# Patient Record
Sex: FEMALE | Race: WHITE | NOT HISPANIC OR LATINO | URBAN - METROPOLITAN AREA
[De-identification: names, ages, dates, MRNs, and addresses within clinical notes are randomized per-mention and may not be internally consistent; named-entity substitution may affect disease eponyms.]

---

## 2018-02-01 ENCOUNTER — OUTPATIENT (OUTPATIENT)
Dept: OUTPATIENT SERVICES | Facility: HOSPITAL | Age: 83
LOS: 1 days | End: 2018-02-01
Payer: MEDICAID

## 2018-02-01 PROCEDURE — G9001: CPT

## 2018-02-02 RX ORDER — ASPIRIN/CALCIUM CARB/MAGNESIUM 324 MG
1 TABLET ORAL
Qty: 0 | Refills: 0 | COMMUNITY
Start: 2018-02-02 | End: 2018-02-13

## 2018-02-07 ENCOUNTER — EMERGENCY (EMERGENCY)
Facility: HOSPITAL | Age: 83
LOS: 1 days | Discharge: HOME | End: 2018-02-07
Attending: EMERGENCY MEDICINE | Admitting: EMERGENCY MEDICINE
Payer: MEDICARE

## 2018-02-07 VITALS
TEMPERATURE: 98 F | HEART RATE: 79 BPM | OXYGEN SATURATION: 94 % | DIASTOLIC BLOOD PRESSURE: 69 MMHG | RESPIRATION RATE: 20 BRPM | SYSTOLIC BLOOD PRESSURE: 149 MMHG

## 2018-02-07 DIAGNOSIS — M25.552 PAIN IN LEFT HIP: ICD-10-CM

## 2018-02-07 DIAGNOSIS — Z96.642 PRESENCE OF LEFT ARTIFICIAL HIP JOINT: Chronic | ICD-10-CM

## 2018-02-07 LAB
ANION GAP SERPL CALC-SCNC: 10 MMOL/L — SIGNIFICANT CHANGE UP (ref 5–17)
APTT BLD: 26 SEC — LOW (ref 27.5–37.4)
BUN SERPL-MCNC: 28 MG/DL — HIGH (ref 7–23)
CALCIUM SERPL-MCNC: 8.4 MG/DL — SIGNIFICANT CHANGE UP (ref 8.4–10.5)
CHLORIDE SERPL-SCNC: 96 MMOL/L — SIGNIFICANT CHANGE UP (ref 96–108)
CO2 SERPL-SCNC: 28 MMOL/L — SIGNIFICANT CHANGE UP (ref 22–31)
CREAT SERPL-MCNC: 0.98 MG/DL — SIGNIFICANT CHANGE UP (ref 0.5–1.3)
GLUCOSE BLDC GLUCOMTR-MCNC: 118 MG/DL — HIGH (ref 70–99)
GLUCOSE BLDC GLUCOMTR-MCNC: 119 MG/DL — HIGH (ref 70–99)
GLUCOSE BLDC GLUCOMTR-MCNC: 123 MG/DL — HIGH (ref 70–99)
GLUCOSE BLDC GLUCOMTR-MCNC: 126 MG/DL — HIGH (ref 70–99)
GLUCOSE SERPL-MCNC: 141 MG/DL — HIGH (ref 70–99)
HCT VFR BLD CALC: 26.9 % — LOW (ref 34.5–45)
HGB BLD-MCNC: 9.5 G/DL — LOW (ref 11.5–15.5)
INR BLD: 0.96 RATIO — SIGNIFICANT CHANGE UP (ref 0.88–1.16)
MAGNESIUM SERPL-MCNC: 2.2 MG/DL — SIGNIFICANT CHANGE UP (ref 1.6–2.6)
MCHC RBC-ENTMCNC: 33.1 PG — SIGNIFICANT CHANGE UP (ref 27–34)
MCHC RBC-ENTMCNC: 35.4 GM/DL — SIGNIFICANT CHANGE UP (ref 32–36)
MCV RBC AUTO: 93.5 FL — SIGNIFICANT CHANGE UP (ref 80–100)
PHOSPHATE SERPL-MCNC: 3.6 MG/DL — SIGNIFICANT CHANGE UP (ref 2.5–4.5)
PLATELET # BLD AUTO: 227 K/UL — SIGNIFICANT CHANGE UP (ref 150–400)
POTASSIUM SERPL-MCNC: 4.7 MMOL/L — SIGNIFICANT CHANGE UP (ref 3.5–5.3)
POTASSIUM SERPL-SCNC: 4.7 MMOL/L — SIGNIFICANT CHANGE UP (ref 3.5–5.3)
PROTHROM AB SERPL-ACNC: 10.4 SEC — SIGNIFICANT CHANGE UP (ref 9.8–12.7)
RBC # BLD: 2.88 M/UL — LOW (ref 3.8–5.2)
RBC # FLD: 12.8 % — SIGNIFICANT CHANGE UP (ref 10.3–14.5)
SODIUM SERPL-SCNC: 134 MMOL/L — LOW (ref 135–145)
WBC # BLD: 8.5 K/UL — SIGNIFICANT CHANGE UP (ref 3.8–10.5)
WBC # FLD AUTO: 8.5 K/UL — SIGNIFICANT CHANGE UP (ref 3.8–10.5)

## 2018-02-07 RX ORDER — DEXTROSE 50 % IN WATER 50 %
12.5 SYRINGE (ML) INTRAVENOUS ONCE
Qty: 0 | Refills: 0 | Status: DISCONTINUED | OUTPATIENT
Start: 2018-02-07 | End: 2018-02-08

## 2018-02-07 RX ORDER — SODIUM CHLORIDE 9 MG/ML
1000 INJECTION, SOLUTION INTRAVENOUS
Qty: 0 | Refills: 0 | Status: DISCONTINUED | OUTPATIENT
Start: 2018-02-07 | End: 2018-02-08

## 2018-02-07 RX ORDER — DOCUSATE SODIUM 100 MG
100 CAPSULE ORAL THREE TIMES A DAY
Qty: 0 | Refills: 0 | Status: DISCONTINUED | OUTPATIENT
Start: 2018-02-07 | End: 2018-02-08

## 2018-02-07 RX ORDER — DULOXETINE HYDROCHLORIDE 30 MG/1
30 CAPSULE, DELAYED RELEASE ORAL DAILY
Qty: 0 | Refills: 0 | Status: DISCONTINUED | OUTPATIENT
Start: 2018-02-07 | End: 2018-02-08

## 2018-02-07 RX ORDER — VALSARTAN 80 MG/1
160 TABLET ORAL DAILY
Qty: 0 | Refills: 0 | Status: DISCONTINUED | OUTPATIENT
Start: 2018-02-07 | End: 2018-02-08

## 2018-02-07 RX ORDER — POLYETHYLENE GLYCOL 3350 17 G/17G
17 POWDER, FOR SOLUTION ORAL DAILY
Qty: 0 | Refills: 0 | Status: DISCONTINUED | OUTPATIENT
Start: 2018-02-07 | End: 2018-02-08

## 2018-02-07 RX ORDER — PANTOPRAZOLE SODIUM 20 MG/1
40 TABLET, DELAYED RELEASE ORAL
Qty: 0 | Refills: 0 | Status: DISCONTINUED | OUTPATIENT
Start: 2018-02-07 | End: 2018-02-08

## 2018-02-07 RX ORDER — SENNA PLUS 8.6 MG/1
2 TABLET ORAL AT BEDTIME
Qty: 0 | Refills: 0 | Status: DISCONTINUED | OUTPATIENT
Start: 2018-02-07 | End: 2018-02-08

## 2018-02-07 RX ORDER — ACETAMINOPHEN 500 MG
650 TABLET ORAL EVERY 6 HOURS
Qty: 0 | Refills: 0 | Status: DISCONTINUED | OUTPATIENT
Start: 2018-02-07 | End: 2018-02-08

## 2018-02-07 RX ORDER — DEXTROSE 50 % IN WATER 50 %
1 SYRINGE (ML) INTRAVENOUS ONCE
Qty: 0 | Refills: 0 | Status: DISCONTINUED | OUTPATIENT
Start: 2018-02-07 | End: 2018-02-08

## 2018-02-07 RX ORDER — GABAPENTIN 400 MG/1
100 CAPSULE ORAL THREE TIMES A DAY
Qty: 0 | Refills: 0 | Status: DISCONTINUED | OUTPATIENT
Start: 2018-02-07 | End: 2018-02-08

## 2018-02-07 RX ORDER — DEXTROSE 50 % IN WATER 50 %
25 SYRINGE (ML) INTRAVENOUS ONCE
Qty: 0 | Refills: 0 | Status: DISCONTINUED | OUTPATIENT
Start: 2018-02-07 | End: 2018-02-08

## 2018-02-07 RX ORDER — FERROUS SULFATE 325(65) MG
325 TABLET ORAL DAILY
Qty: 0 | Refills: 0 | Status: DISCONTINUED | OUTPATIENT
Start: 2018-02-07 | End: 2018-02-08

## 2018-02-07 RX ORDER — INSULIN LISPRO 100/ML
VIAL (ML) SUBCUTANEOUS AT BEDTIME
Qty: 0 | Refills: 0 | Status: DISCONTINUED | OUTPATIENT
Start: 2018-02-07 | End: 2018-02-08

## 2018-02-07 RX ORDER — OXYCODONE HYDROCHLORIDE 5 MG/1
5 TABLET ORAL EVERY 4 HOURS
Qty: 0 | Refills: 0 | Status: DISCONTINUED | OUTPATIENT
Start: 2018-02-07 | End: 2018-02-08

## 2018-02-07 RX ORDER — GLUCAGON INJECTION, SOLUTION 0.5 MG/.1ML
1 INJECTION, SOLUTION SUBCUTANEOUS ONCE
Qty: 0 | Refills: 0 | Status: DISCONTINUED | OUTPATIENT
Start: 2018-02-07 | End: 2018-02-08

## 2018-02-07 RX ORDER — ACETAMINOPHEN 500 MG
1000 TABLET ORAL ONCE
Qty: 0 | Refills: 0 | Status: COMPLETED | OUTPATIENT
Start: 2018-02-07 | End: 2018-02-07

## 2018-02-07 RX ORDER — INSULIN LISPRO 100/ML
VIAL (ML) SUBCUTANEOUS
Qty: 0 | Refills: 0 | Status: DISCONTINUED | OUTPATIENT
Start: 2018-02-07 | End: 2018-02-08

## 2018-02-07 RX ORDER — AMLODIPINE BESYLATE 2.5 MG/1
10 TABLET ORAL DAILY
Qty: 0 | Refills: 0 | Status: DISCONTINUED | OUTPATIENT
Start: 2018-02-07 | End: 2018-02-08

## 2018-02-07 RX ORDER — HEPARIN SODIUM 5000 [USP'U]/ML
5000 INJECTION INTRAVENOUS; SUBCUTANEOUS EVERY 12 HOURS
Qty: 0 | Refills: 0 | Status: DISCONTINUED | OUTPATIENT
Start: 2018-02-07 | End: 2018-02-08

## 2018-02-07 RX ORDER — ASPIRIN/CALCIUM CARB/MAGNESIUM 324 MG
325 TABLET ORAL
Qty: 0 | Refills: 0 | Status: DISCONTINUED | OUTPATIENT
Start: 2018-02-07 | End: 2018-02-08

## 2018-02-07 RX ORDER — MICONAZOLE NITRATE 2 %
1 CREAM (GRAM) TOPICAL
Qty: 0 | Refills: 0 | Status: DISCONTINUED | OUTPATIENT
Start: 2018-02-07 | End: 2018-02-08

## 2018-02-07 RX ORDER — LEVOTHYROXINE SODIUM 125 MCG
125 TABLET ORAL DAILY
Qty: 0 | Refills: 0 | Status: DISCONTINUED | OUTPATIENT
Start: 2018-02-07 | End: 2018-02-08

## 2018-02-07 RX ADMIN — Medication 1 TABLET(S): at 17:55

## 2018-02-07 RX ADMIN — DULOXETINE HYDROCHLORIDE 30 MILLIGRAM(S): 30 CAPSULE, DELAYED RELEASE ORAL at 18:01

## 2018-02-07 RX ADMIN — SENNA PLUS 2 TABLET(S): 8.6 TABLET ORAL at 23:34

## 2018-02-07 RX ADMIN — Medication 1000 MILLIGRAM(S): at 09:35

## 2018-02-07 RX ADMIN — Medication 325 MILLIGRAM(S): at 17:56

## 2018-02-07 RX ADMIN — Medication 100 MILLIGRAM(S): at 23:33

## 2018-02-07 RX ADMIN — Medication 400 MILLIGRAM(S): at 09:01

## 2018-02-07 RX ADMIN — AMLODIPINE BESYLATE 10 MILLIGRAM(S): 2.5 TABLET ORAL at 17:55

## 2018-02-07 RX ADMIN — GABAPENTIN 100 MILLIGRAM(S): 400 CAPSULE ORAL at 23:34

## 2018-02-07 RX ADMIN — POLYETHYLENE GLYCOL 3350 17 GRAM(S): 17 POWDER, FOR SOLUTION ORAL at 18:01

## 2018-02-07 RX ADMIN — HEPARIN SODIUM 5000 UNIT(S): 5000 INJECTION INTRAVENOUS; SUBCUTANEOUS at 17:56

## 2018-02-07 RX ADMIN — Medication 325 MILLIGRAM(S): at 17:55

## 2018-02-07 RX ADMIN — Medication 125 MICROGRAM(S): at 17:56

## 2018-02-07 NOTE — H&P ADULT - NSHPSOCIALHISTORY_GEN_ALL_CORE
Social History:    Marital Status:  (  x )    (   ) Single    (   )    (  )   Occupation:   Lives with: (  ) alone  (  ) children   ( x ) spouse   (  ) parents  (  ) other    Substance Use (street drugs): ( x ) never used  (  ) other:  Tobacco Usage:  (  x ) never smoked   (   ) former smoker   (   ) current smoker  (     ) pack years  (        ) last cigarette date  Alcohol Usage: denies    (     ) Advanced Directives: (     ) None    (      ) DNR    (     ) DNI    (     ) Health Care Proxy:

## 2018-02-07 NOTE — ED PROVIDER NOTE - MEDICAL DECISION MAKING DETAILS
89F hx htn dm hypothyroid presents to the ED from [facility] s/p fall. Pt was found down next to toilet. said that she lost her balance when trying to get up and fell. Hit left side. no loc didn't hit head no blood thinners. Brought in for eval. now pt complaining of pain in left hip constant moderate intensity non-radiating. no exam pt with cn2-12 intact clear lungs rrr abd soft nontender but ttp of left hip. concern for fx. will obtain labs xray ortho and reassess. Gene Centeno M.D., Attending Physician

## 2018-02-07 NOTE — ED PROVIDER NOTE - CARE PLAN
Principal Discharge DX:	Hip pain, acute, left  Assessment and plan of treatment:	Patient s/p mechanical fall on left hip   C/O pain in are  S/P Intramedullary nailing for prior fx  XR hip showing lesser trochanteric fx, unclear if acute 2/2 presenting fall or chronic for which patient had hardware implementation   Will discuss with Ortho

## 2018-02-07 NOTE — CONSULT NOTE ADULT - ASSESSMENT
Assessment: s/p Open Reduction Internal Fixation / Surgical Repair / IM Nail L Hip     Plan:  Xrays reviewed w/ Dr Palm (on Call)  NO surgical intervention @ this Time  Follow up w/ initial Surgeon @ [facility] rehab  ED Staff / Son / Ortho Resident notified Assessment: s/p Open Reduction Internal Fixation / Surgical Repair / IM Nail L Hip     Plan:  pain control  Xrays reviewed w/ Dr Palm (on Call)  WBAT  NO surgical intervention @ this Time  Follow up w/ initial Surgeon @ [facility] rehab  ED Staff / Son / Ortho Resident notified  Ortho stable for discharge

## 2018-02-07 NOTE — H&P ADULT - NSHPREVIEWOFSYSTEMS_GEN_ALL_CORE
REVIEW OF SYSTEMS:    CONSTITUTIONAL: No weakness, fevers or chills  EYES/ENT: No visual changes;  No vertigo or throat pain   NECK: No pain or stiffness  RESPIRATORY: No cough, wheezing, hemoptysis; No shortness of breath  CARDIOVASCULAR: No chest pain or palpitations  GASTROINTESTINAL: No abdominal or epigastric pain. No nausea, vomiting, or hematemesis; No diarrhea or constipation. No melena or hematochezia.  GENITOURINARY: No dysuria, frequency or hematuria  NEUROLOGICAL: No numbness or weakness  SKIN: No itching, burning, rashes, or lesions + L hip pain  All other review of systems is negative unless indicated above.

## 2018-02-07 NOTE — H&P ADULT - NSHPPHYSICALEXAM_GEN_ALL_CORE
PHYSICAL EXAMINATION:  Vital Signs Last 24 Hrs  T(C): 36.6 (07 Feb 2018 13:26), Max: 36.8 (07 Feb 2018 04:03)  T(F): 97.8 (07 Feb 2018 13:26), Max: 98.2 (07 Feb 2018 04:03)  HR: 63 (07 Feb 2018 13:26) (63 - 79)  BP: 174/63 (07 Feb 2018 13:26) (149/69 - 174/63)  BP(mean): --  RR: 16 (07 Feb 2018 13:26) (16 - 20)  SpO2: 100% (07 Feb 2018 13:26) (94% - 100%)  CAPILLARY BLOOD GLUCOSE      POCT Blood Glucose.: 119 mg/dL (07 Feb 2018 14:03)  POCT Blood Glucose.: 123 mg/dL (07 Feb 2018 13:43)      GENERAL: NAD, well-groomed, well-developed  HEAD:  atraumatic, normocephalic  EYES: sclera anicteric  ENMT: mucous membranes moist  NECK: supple, No JVD  CHEST/LUNG: clear to auscultation bilaterally; no rales, rhonchi, or wheezing b/l  HEART: normal S1, S2  ABDOMEN: BS+, soft, ND, NT   EXTREMITIES:  pulses palpable; no clubbing, cyanosis, or edema b/l LEs L Hip tenderness  NEURO: awake, alert, interactive; moves all extremities  SKIN: no rashes or lesions

## 2018-02-07 NOTE — ED ADULT NURSE REASSESSMENT NOTE - COMFORT CARE
treatment delay explained/wait time explained/plan of care explained/side rails up/warm blanket provided

## 2018-02-07 NOTE — CONSULT NOTE ADULT - SUBJECTIVE AND OBJECTIVE BOX
HISTORY OF PRESENT ILLNESS:    High Risk Travel:      · Chief Complaint: The patient is a 89y Female complaining of fall.	  · HPI Objective Statement: Patient is a 89 yo F with PMHx DM, HTN, OA, Hypothyroidism, anemia presenting from [facility] rehab s/p mechanical fall, with c/o severe left hip and leg pain.  Patient was using the restroom unsupervised, and fell on her left hip when standing up from the toilet to wipe herself. Patient denies loc, and hitting her head, c/o severe pain in her left hip and leg pain.  Patient had XR Left hip at [facility], and requested to be transferred to [facility] ER prior to results.	    PAST MEDICAL/SURGICAL/FAMILY/SOCIAL HISTORY:    Past Medical History:  Essential hypertension    Hypothyroidism, unspecified type    Osteoarthritis, unspecified osteoarthritis type, unspecified site    Type 2 diabetes mellitus without complication, without long-term current use of insulin.     Past Surgical History:  S/P hip hemiarthroplasty , Right  s/p Open Reduction Internal Fixation / Surgical Repair L Hip (1/18) @ [place] Surgeon: Unknown    Consult:  Asked to evaluate pt w/ L Hip pain.  As per son, Pt had surgery IM Nail L Hip  last week 1/31/18 @ [place] Surgeon Unknown.    HPI as above    Vital Signs Last 24 Hrs  T(C): 36.7 (07 Feb 2018 07:17), Max: 36.8 (07 Feb 2018 04:03)  T(F): 98 (07 Feb 2018 07:17), Max: 98.2 (07 Feb 2018 04:03)  HR: 68 (07 Feb 2018 07:17) (68 - 79)  BP: 150/60 (07 Feb 2018 07:17) (149/69 - 150/60)  BP(mean): --  RR: 19 (07 Feb 2018 07:17) (19 - 20)  SpO2: 98% (07 Feb 2018 07:17) (94% - 98%)                          9.5<L>  8.5   )-----------( 227      ( 07 Feb 2018 04:26 )             26.9<L>      O/E: Pt in NAD. Conversive, appropriate for her age          LLE: Incisions healing well                  (+) surrounding ecchymosis w/ mild swelling                   calves soft                  Unable to SLR 2/2 pain                  (+) DF PF EHL 4+/5                  Sensation grossly in tact                  1+ pulses            RLE:  Tested w/o abnormalities noted    Xray (pelvis):  Reviewed w/ Dr Palm (on call)         IM Nail Noted:  Prosthesis in  acceptable  alignment         (+) O/A noted @ hip (bone on bone)      EXAM:  CT BRAIN                        PROCEDURE DATE:  02/07/2018      IMPRESSION:   1.  No CT evidence of acute intracranial hemorrhage, extra-axial   collection, brain edema or calvarial fracture.   2.  Mild to moderate generalized cerebral volume loss.  Mild chronic   microvascular ischemic disease.  3.  Patchy inflammatory mucosal thickening the paranasal sinuses.    Secretions in the right maxillary sinus and sphenoid sinuses could   represent trapped secretions versus sinusitis.

## 2018-02-07 NOTE — H&P ADULT - NSHPLABSRESULTS_GEN_ALL_CORE
9.5    8.5   )-----------( 227      ( 07 Feb 2018 04:26 )             26.9       02-07    134<L>  |  96  |  28<H>  ----------------------------<  141<H>  4.7   |  28  |  0.98    Ca    8.4      07 Feb 2018 04:26  Phos  3.6     02-07  Mg     2.2     02-07                    PT/INR - ( 07 Feb 2018 04:26 )   PT: 10.4 sec;   INR: 0.96 ratio         PTT - ( 07 Feb 2018 04:26 )  PTT:26.0 sec    Lactate Trend            CAPILLARY BLOOD GLUCOSE      POCT Blood Glucose.: 119 mg/dL (07 Feb 2018 14:03)

## 2018-02-07 NOTE — ED PROVIDER NOTE - EYES NEGATIVE STATEMENT, MLM
no discharge, no irritation, no pain, no redness, and no visual changes. no discharge,  no pain, no redness, and no visual changes.

## 2018-02-07 NOTE — H&P ADULT - HISTORY OF PRESENT ILLNESS
Patient is a 89 yo F with PMHx DM, HTN, OA, Hypothyroidism, anemia presenting from [facility] rehab s/p mechanical fall, with c/o severe left hip and leg pain.  Patient was using the restroom unsupervised, and fell on her left hip when standing up from the toilet to wipe herself. Patient denies loc, and hitting her head, c/o severe pain in her left hip and leg pain.  Patient had XR Left hip at [facility], and requested to be transferred to [facility] ER prior to results.

## 2018-02-07 NOTE — ED ADULT NURSE NOTE - OBJECTIVE STATEMENT
Pt with hx recent left hip replacement BIBA from [facility] c/o left hip pain s/p fall. Pt states that she was getting up from seated when she lost her balance and fell. X-ray done at rehab with results pending. As per EMS, pt c/o continued pain. On arrival, pt A&Ox3. Pt sleeping comfortably off and on. Denies cp/sob. Respirations even/unlabored. Abd soft. No n/v/d. Denies urinary symptoms. Left hip incision CDI with sutures in place.

## 2018-02-07 NOTE — ED PROVIDER NOTE - PLAN OF CARE
Patient s/p mechanical fall on left hip   C/O pain in are  S/P Intramedullary nailing for prior fx  XR hip showing lesser trochanteric fx, unclear if acute 2/2 presenting fall or chronic for which patient had hardware implementation   Will discuss with Ortho

## 2018-02-07 NOTE — ED PROVIDER NOTE - ATTENDING CONTRIBUTION TO CARE
89F hx htn dm hypothyroid presents to the ED from [facility] s/p fall. Pt was found down next to toilet. said that she lost her balance when trying to get up and fell. Hit left side. no loc didn't hit head no blood thinners. Brought in for eval. now pt complaining of pain in left hip constant moderate intensity non-radiating. no exam pt with cn2-12 intact clear lungs rrr abd soft nontender but ttp of left hip. concern for fx. will obtain labs xray ortho and reassess.     Constitutional: No fever or chills  Eyes: No visual changes  HEENT: No throat pain  CV: No chest pain  Resp: No SOB no cough  GI: No abd pain, nausea or vomiting  : No dysuria  MSK: left hip pain  Skin: No rash  Neuro: No headache     Constitutional: mild distress A  Eyes: PERRLA EOMI  Head: Normocephalic atraumatic  Mouth: MMM  Cardiac: regular rate   Resp: Lungs CTAB  GI: Abd s/nt/nd  Neuro: CN2-12 intact  Skin: bruising to left hip  msk: ttp of left hip. no ttp of c/s or chest wall.   Gene Centeno M.D., Attending Physician

## 2018-02-07 NOTE — ED PROVIDER NOTE - OBJECTIVE STATEMENT
Patient is a 89 yo F with PMHx DM, HTN, OA, presenting from [facility] rehab s/p mechanical fall, with c/o severe left hip and leg pain.  Patient was using the restroom unsupervised, and fell on her left hip when standing up from the toilet to wipe herself. Patient denies loc, and hitting her head, c/o severe pain in her LLE.  Patient had XR Left hip at [facility], and requested to be transferred to [facility] ER prior to results. Patient is a 89 yo F with PMHx DM, HTN, OA, presenting from [facility] rehab s/p mechanical fall, with c/o severe left hip and leg pain.  Patient was using the restroom unsupervised, and fell on her left hip when standing up from the toilet to wipe herself. Patient denies loc, and hitting her head, c/o severe pain in her left hip and leg pain.  Patient had XR Left hip at [facility], and requested to be transferred to [facility] ER prior to results. Patient is a 89 yo F with PMHx DM, HTN, OA, Hypothyroidism, anemia presenting from [facility] rehab s/p mechanical fall, with c/o severe left hip and leg pain.  Patient was using the restroom unsupervised, and fell on her left hip when standing up from the toilet to wipe herself. Patient denies loc, and hitting her head, c/o severe pain in her left hip and leg pain.  Patient had XR Left hip at [facility], and requested to be transferred to [facility] ER prior to results.

## 2018-02-07 NOTE — H&P ADULT - ASSESSMENT
1 f with  s/p fall- PT  L hip s/p ORIF- ortho evaluation noted. No intervention .Pain control. Follow with Ortho surgeon from [facility].  Diabetes control  Diabetic diet  HTN control  Depression continue Cymbalta  Anemia- continue iron  Further action as per clinical course   Robert Jacob MD pager [phone]

## 2018-02-07 NOTE — ED PROVIDER NOTE - PMH
Essential hypertension    Hypothyroidism, unspecified type    Osteoarthritis, unspecified osteoarthritis type, unspecified site    Type 2 diabetes mellitus without complication, without long-term current use of insulin

## 2018-02-08 ENCOUNTER — TRANSCRIPTION ENCOUNTER (OUTPATIENT)
Age: 83
End: 2018-02-08

## 2018-02-08 VITALS
DIASTOLIC BLOOD PRESSURE: 70 MMHG | HEART RATE: 82 BPM | SYSTOLIC BLOOD PRESSURE: 151 MMHG | TEMPERATURE: 98 F | OXYGEN SATURATION: 94 %

## 2018-02-08 LAB
ANION GAP SERPL CALC-SCNC: 10 MMOL/L — SIGNIFICANT CHANGE UP (ref 5–17)
BUN SERPL-MCNC: 19 MG/DL — SIGNIFICANT CHANGE UP (ref 7–23)
CALCIUM SERPL-MCNC: 8.6 MG/DL — SIGNIFICANT CHANGE UP (ref 8.4–10.5)
CHLORIDE SERPL-SCNC: 97 MMOL/L — SIGNIFICANT CHANGE UP (ref 96–108)
CO2 SERPL-SCNC: 29 MMOL/L — SIGNIFICANT CHANGE UP (ref 22–31)
CREAT SERPL-MCNC: 0.49 MG/DL — LOW (ref 0.5–1.3)
GLUCOSE BLDC GLUCOMTR-MCNC: 106 MG/DL — HIGH (ref 70–99)
GLUCOSE BLDC GLUCOMTR-MCNC: 116 MG/DL — HIGH (ref 70–99)
GLUCOSE BLDC GLUCOMTR-MCNC: 185 MG/DL — HIGH (ref 70–99)
GLUCOSE SERPL-MCNC: 104 MG/DL — HIGH (ref 70–99)
HBA1C BLD-MCNC: 6 % — HIGH (ref 4–5.6)
HCT VFR BLD CALC: 29.5 % — LOW (ref 34.5–45)
HGB BLD-MCNC: 9.5 G/DL — LOW (ref 11.5–15.5)
MCHC RBC-ENTMCNC: 29.6 PG — SIGNIFICANT CHANGE UP (ref 27–34)
MCHC RBC-ENTMCNC: 32.2 GM/DL — SIGNIFICANT CHANGE UP (ref 32–36)
MCV RBC AUTO: 91.9 FL — SIGNIFICANT CHANGE UP (ref 80–100)
PLATELET # BLD AUTO: 288 K/UL — SIGNIFICANT CHANGE UP (ref 150–400)
POTASSIUM SERPL-MCNC: 4.7 MMOL/L — SIGNIFICANT CHANGE UP (ref 3.5–5.3)
POTASSIUM SERPL-SCNC: 4.7 MMOL/L — SIGNIFICANT CHANGE UP (ref 3.5–5.3)
RBC # BLD: 3.21 M/UL — LOW (ref 3.8–5.2)
RBC # FLD: 14.7 % — HIGH (ref 10.3–14.5)
SODIUM SERPL-SCNC: 136 MMOL/L — SIGNIFICANT CHANGE UP (ref 135–145)
TSH SERPL-MCNC: 2.43 UIU/ML — SIGNIFICANT CHANGE UP (ref 0.27–4.2)
WBC # BLD: 6.8 K/UL — SIGNIFICANT CHANGE UP (ref 3.8–10.5)
WBC # FLD AUTO: 6.8 K/UL — SIGNIFICANT CHANGE UP (ref 3.8–10.5)

## 2018-02-08 PROCEDURE — 73552 X-RAY EXAM OF FEMUR 2/>: CPT

## 2018-02-08 PROCEDURE — 97161 PT EVAL LOW COMPLEX 20 MIN: CPT

## 2018-02-08 PROCEDURE — 84100 ASSAY OF PHOSPHORUS: CPT

## 2018-02-08 PROCEDURE — 96374 THER/PROPH/DIAG INJ IV PUSH: CPT

## 2018-02-08 PROCEDURE — G8978: CPT

## 2018-02-08 PROCEDURE — 83036 HEMOGLOBIN GLYCOSYLATED A1C: CPT

## 2018-02-08 PROCEDURE — 84443 ASSAY THYROID STIM HORMONE: CPT

## 2018-02-08 PROCEDURE — 93005 ELECTROCARDIOGRAM TRACING: CPT

## 2018-02-08 PROCEDURE — G8979: CPT

## 2018-02-08 PROCEDURE — 82962 GLUCOSE BLOOD TEST: CPT

## 2018-02-08 PROCEDURE — 99285 EMERGENCY DEPT VISIT HI MDM: CPT | Mod: 25

## 2018-02-08 PROCEDURE — 85027 COMPLETE CBC AUTOMATED: CPT

## 2018-02-08 PROCEDURE — 85610 PROTHROMBIN TIME: CPT

## 2018-02-08 PROCEDURE — 72170 X-RAY EXAM OF PELVIS: CPT

## 2018-02-08 PROCEDURE — 80048 BASIC METABOLIC PNL TOTAL CA: CPT

## 2018-02-08 PROCEDURE — 83735 ASSAY OF MAGNESIUM: CPT

## 2018-02-08 PROCEDURE — 85730 THROMBOPLASTIN TIME PARTIAL: CPT

## 2018-02-08 PROCEDURE — G8980: CPT

## 2018-02-08 PROCEDURE — 70450 CT HEAD/BRAIN W/O DYE: CPT

## 2018-02-08 RX ORDER — ASPIRIN/CALCIUM CARB/MAGNESIUM 324 MG
1 TABLET ORAL
Qty: 0 | Refills: 0 | COMMUNITY
Start: 2018-02-08

## 2018-02-08 RX ORDER — HEPARIN SODIUM 5000 [USP'U]/ML
5000 INJECTION INTRAVENOUS; SUBCUTANEOUS
Qty: 0 | Refills: 0 | COMMUNITY
Start: 2018-02-08

## 2018-02-08 RX ORDER — DULOXETINE HYDROCHLORIDE 30 MG/1
1 CAPSULE, DELAYED RELEASE ORAL
Qty: 0 | Refills: 0 | COMMUNITY
Start: 2018-02-08

## 2018-02-08 RX ORDER — POLYETHYLENE GLYCOL 3350 17 G/17G
17 POWDER, FOR SOLUTION ORAL DAILY
Qty: 0 | Refills: 0 | Status: DISCONTINUED | OUTPATIENT
Start: 2018-02-08 | End: 2018-02-08

## 2018-02-08 RX ORDER — VALSARTAN 80 MG/1
1 TABLET ORAL
Qty: 0 | Refills: 0 | COMMUNITY
Start: 2018-02-08

## 2018-02-08 RX ORDER — GABAPENTIN 400 MG/1
1 CAPSULE ORAL
Qty: 0 | Refills: 0 | COMMUNITY
Start: 2018-02-08

## 2018-02-08 RX ORDER — ACETAMINOPHEN 500 MG
2 TABLET ORAL
Qty: 0 | Refills: 0 | COMMUNITY
Start: 2018-02-08

## 2018-02-08 RX ORDER — POLYETHYLENE GLYCOL 3350 17 G/17G
17 POWDER, FOR SOLUTION ORAL
Qty: 0 | Refills: 0 | COMMUNITY
Start: 2018-02-08

## 2018-02-08 RX ORDER — OXYCODONE HYDROCHLORIDE 5 MG/1
1 TABLET ORAL
Qty: 0 | Refills: 0 | COMMUNITY
Start: 2018-02-08

## 2018-02-08 RX ORDER — LEVOTHYROXINE SODIUM 125 MCG
1 TABLET ORAL
Qty: 0 | Refills: 0 | COMMUNITY
Start: 2018-02-08

## 2018-02-08 RX ORDER — DOCUSATE SODIUM 100 MG
1 CAPSULE ORAL
Qty: 0 | Refills: 0 | COMMUNITY
Start: 2018-02-08

## 2018-02-08 RX ORDER — AMLODIPINE BESYLATE 2.5 MG/1
1 TABLET ORAL
Qty: 0 | Refills: 0 | COMMUNITY
Start: 2018-02-08

## 2018-02-08 RX ADMIN — Medication 125 MICROGRAM(S): at 05:48

## 2018-02-08 RX ADMIN — VALSARTAN 160 MILLIGRAM(S): 80 TABLET ORAL at 06:23

## 2018-02-08 RX ADMIN — GABAPENTIN 100 MILLIGRAM(S): 400 CAPSULE ORAL at 05:48

## 2018-02-08 RX ADMIN — GABAPENTIN 100 MILLIGRAM(S): 400 CAPSULE ORAL at 13:20

## 2018-02-08 RX ADMIN — PANTOPRAZOLE SODIUM 40 MILLIGRAM(S): 20 TABLET, DELAYED RELEASE ORAL at 09:14

## 2018-02-08 RX ADMIN — Medication 325 MILLIGRAM(S): at 05:48

## 2018-02-08 RX ADMIN — Medication 100 MILLIGRAM(S): at 13:20

## 2018-02-08 RX ADMIN — Medication 2: at 13:21

## 2018-02-08 RX ADMIN — DULOXETINE HYDROCHLORIDE 30 MILLIGRAM(S): 30 CAPSULE, DELAYED RELEASE ORAL at 12:25

## 2018-02-08 RX ADMIN — Medication 100 MILLIGRAM(S): at 05:48

## 2018-02-08 RX ADMIN — AMLODIPINE BESYLATE 10 MILLIGRAM(S): 2.5 TABLET ORAL at 05:48

## 2018-02-08 RX ADMIN — Medication 10 MILLIGRAM(S): at 13:20

## 2018-02-08 RX ADMIN — POLYETHYLENE GLYCOL 3350 17 GRAM(S): 17 POWDER, FOR SOLUTION ORAL at 12:25

## 2018-02-08 RX ADMIN — Medication 325 MILLIGRAM(S): at 12:25

## 2018-02-08 RX ADMIN — HEPARIN SODIUM 5000 UNIT(S): 5000 INJECTION INTRAVENOUS; SUBCUTANEOUS at 05:50

## 2018-02-08 RX ADMIN — Medication 1 APPLICATION(S): at 09:13

## 2018-02-08 RX ADMIN — Medication 1 TABLET(S): at 12:25

## 2018-02-08 NOTE — PHYSICAL THERAPY INITIAL EVALUATION ADULT - PERTINENT HX OF CURRENT PROBLEM, REHAB EVAL
89 yo F w/ PMHx DM, HTN, OA, Hypothyroidism, anemia presenting from [facility] rehab s/p mechanical fall, with c/o severe left and leg pain. Pt was using the restroom unsupervised, and fell on left hip when standing up from the toilet to wipe herself. Denies loc, and hitting her head. Pt had XR Left hip at [facility], and requested to be transferred to [facility] ER prior to results. Ortho consulted, WBAT. PT consult placed.

## 2018-02-08 NOTE — DISCHARGE NOTE ADULT - MEDICATION SUMMARY - MEDICATIONS TO TAKE
I will START or STAY ON the medications listed below when I get home from the hospital:    vitamin b complex  -- 1 tab(s) by mouth once a day  -- Indication: For Supplement    A & D Ointment  -- Apply on skin to affected area (left, right heels) 3 times a day  -- Indication: For Skin onitment    acetaminophen 325 mg oral tablet  -- 2 tab(s) by mouth every 6 hours, As needed, Mild Pain (1 - 3)  -- Indication: For Pain of left hip    aspirin 325 mg oral tablet  -- 1 tab(s) by mouth 2 times a day  -- Indication: For Heart health    oxyCODONE 5 mg oral tablet  -- 1 tab(s) by mouth every 4 hours, As needed, Moderate Pain (4 - 6)  -- Indication: For Pain of left hip    valsartan 160 mg oral tablet  -- 1 tab(s) by mouth once a day  -- Indication: For Hypertension    heparin  -- 5000 unit(s) subcutaneously every 12 hours  -- Indication: For DVT prophylaxis    gabapentin 100 mg oral capsule  -- 1 cap(s) by mouth 3 times a day  -- Indication: For Pain    DULoxetine 30 mg oral delayed release capsule  -- 1 cap(s) by mouth once a day  -- Indication: For Mood    metFORMIN 1000 mg oral tablet  -- 1 tab(s) by mouth once a day  -- Indication: For Type 2 diabetes mellitus without complication, without long-term current use of insulin    Januvia 100 mg oral tablet  -- 1 tab(s) by mouth once a day  -- Indication: For Type 2 diabetes mellitus without complication, without long-term current use of insulin    amLODIPine 10 mg oral tablet  -- 1 tab(s) by mouth once a day  -- Indication: For Hypertension    clotrimazole-betamethasone dipropionate 1%-0.05% topical cream  -- Apply on skin to affected area on both feet 2 times a day for 2 weeks    Started: 2/2/18    -- Indication: For Antifungal cream    Karolyn 2% topical cream  -- Apply on skin to affected area (sacral region, right buttocks, left buttocks) 3 times a day  -- Indication: For Skin ointment    ferrous gluconate 324 mg (38 mg elemental iron) oral tablet  -- 1 tab(s) by mouth once a day  -- Indication: For Supplement    docusate sodium 100 mg oral capsule  -- 1 cap(s) by mouth 3 times a day  -- Indication: For Stool softener    polyethylene glycol 3350 oral powder for reconstitution  -- 17 gram(s) by mouth once a day, As needed, Constipation  -- Indication: For Laxative    Senna 8.6 mg oral tablet  -- 2 tab(s) by mouth once a day  -- Indication: For Laxative    Co-Q10 100 mg oral capsule  -- 1 cap(s) by mouth once a day  -- Indication: For Supplement    omeprazole 20 mg oral delayed release capsule  -- 1 cap(s) by mouth once a day (before a meal)  -- Indication: For Stomach Acid     levothyroxine 125 mcg (0.125 mg) oral tablet  -- 1 tab(s) by mouth once a day  -- Indication: For Hypothyroidism    Multiple Vitamins oral tablet  -- 1 tab(s) by mouth once a day  -- Indication: For Supplement    Vitamin D3 50,000 intl units oral capsule  -- 1 cap(s) by mouth once a week on monday  -- Indication: For Supplement I will START or STAY ON the medications listed below when I get home from the hospital:    vitamin b complex  -- 1 tab(s) by mouth once a day  -- Indication: For Supplement    A & D Ointment  -- Apply on skin to affected area (left, right heels) 3 times a day  -- Indication: For Skin ointment    acetaminophen 325 mg oral tablet  -- 2 tab(s) by mouth every 6 hours, As needed, Mild Pain (1 - 3)  -- Indication: For Pain of left hip    aspirin 325 mg oral tablet  -- 1 tab(s) by mouth 2 times a day  -- Indication: For Heart health    oxyCODONE 5 mg oral tablet  -- 1 tab(s) by mouth every 4 hours, As needed, Moderate Pain (4 - 6)  -- Indication: For Pain of left hip    valsartan 160 mg oral tablet  -- 1 tab(s) by mouth once a day  -- Indication: For Hypertension    heparin  -- 5000 unit(s) subcutaneously every 12 hours  -- Indication: For DVT prophylaxis    gabapentin 100 mg oral capsule  -- 1 cap(s) by mouth 3 times a day  -- Indication: For Pain    DULoxetine 30 mg oral delayed release capsule  -- 1 cap(s) by mouth once a day  -- Indication: For Mood    metFORMIN 1000 mg oral tablet  -- 1 tab(s) by mouth once a day  -- Indication: For Type 2 diabetes mellitus without complication, without long-term current use of insulin    Januvia 100 mg oral tablet  -- 1 tab(s) by mouth once a day  -- Indication: For Type 2 diabetes mellitus without complication, without long-term current use of insulin    amLODIPine 10 mg oral tablet  -- 1 tab(s) by mouth once a day  -- Indication: For Hypertension    clotrimazole-betamethasone dipropionate 1%-0.05% topical cream  -- Apply on skin to affected area on both feet 2 times a day for 2 weeks    Started: 2/2/18    -- Indication: For Antifungal cream    Karolyn 2% topical cream  -- Apply on skin to affected area (sacral region, right buttocks, left buttocks) 3 times a day  -- Indication: For Skin ointment    ferrous gluconate 324 mg (38 mg elemental iron) oral tablet  -- 1 tab(s) by mouth once a day  -- Indication: For Supplement    docusate sodium 100 mg oral capsule  -- 1 cap(s) by mouth 3 times a day  -- Indication: For Stool softener    polyethylene glycol 3350 oral powder for reconstitution  -- 17 gram(s) by mouth once a day, As needed, Constipation  -- Indication: For Laxative    Senna 8.6 mg oral tablet  -- 2 tab(s) by mouth once a day  -- Indication: For Laxative    Co-Q10 100 mg oral capsule  -- 1 cap(s) by mouth once a day  -- Indication: For Supplement    omeprazole 20 mg oral delayed release capsule  -- 1 cap(s) by mouth once a day (before a meal)  -- Indication: For Stomach Acid     levothyroxine 125 mcg (0.125 mg) oral tablet  -- 1 tab(s) by mouth once a day  -- Indication: For Hypothyroidism    Multiple Vitamins oral tablet  -- 1 tab(s) by mouth once a day  -- Indication: For Supplement    Vitamin D3 50,000 intl units oral capsule  -- 1 cap(s) by mouth once a week on monday  -- Indication: For Supplement

## 2018-02-08 NOTE — PHYSICAL THERAPY INITIAL EVALUATION ADULT - GAIT DEVIATIONS NOTED, PT EVAL
decreased dino/decreased weight-shifting ability/increased time in double stance/decreased velocity of limb motion

## 2018-02-08 NOTE — DISCHARGE NOTE ADULT - PATIENT PORTAL LINK FT
You can access the [facility] Portal, offered by [facility], by registering with the following website: [email or web address]

## 2018-02-08 NOTE — PHYSICAL THERAPY INITIAL EVALUATION ADULT - PRECAUTIONS/LIMITATIONS, REHAB EVAL
Hosp course continued from above: Xray (pelvis): IM Nail Noted: Prosthesis in acceptable alignment (+) O/A noted @ hip (bone on bone). CT head IMPRESSION: No evidence of acute intracranial hemorrhage, extra-axial collection, brain edema or calvarial fracture./fall precautions

## 2018-02-08 NOTE — DISCHARGE NOTE ADULT - ADDITIONAL INSTRUCTIONS
Pain medications as needed.  Laxative prn and stool softeners daily.  Follow up with your Orthopedic Surgeon at [facility] within 1 week after your discharge from Rehab.   Call to schedule appointment. Pain medications as needed.  Laxative prn and stool softeners daily.  Follow up with your Orthopedic Surgeon at [facility] and your Primary Care MD, Dr. Cogan within 1 week after your discharge from Rehab.   Call to schedule appointment.

## 2018-02-08 NOTE — DISCHARGE NOTE ADULT - SECONDARY DIAGNOSIS.
Type 2 diabetes mellitus without complication, without long-term current use of insulin Essential hypertension Hypothyroidism, unspecified type

## 2018-02-08 NOTE — PROGRESS NOTE ADULT - SUBJECTIVE AND OBJECTIVE BOX
Patient is a 89y old  Female who presents with a chief complaint of L hip pain s/p fall. (08 Feb 2018 12:39)      SUBJECTIVE / OVERNIGHT EVENTS: Comfortable without new complaints.   Review of Systems  chest pain no  palpitations no  sob no  nausea no  headache no    MEDICATIONS  (STANDING):  amLODIPine   Tablet 10 milliGRAM(s) Oral daily  aspirin 325 milliGRAM(s) Oral two times a day  bisacodyl Suppository 10 milliGRAM(s) Rectal once  dextrose 5%. 1000 milliLiter(s) (50 mL/Hr) IV Continuous <Continuous>  dextrose 50% Injectable 12.5 Gram(s) IV Push once  dextrose 50% Injectable 25 Gram(s) IV Push once  dextrose 50% Injectable 25 Gram(s) IV Push once  docusate sodium 100 milliGRAM(s) Oral three times a day  DULoxetine 30 milliGRAM(s) Oral daily  ferrous    sulfate 325 milliGRAM(s) Oral daily  gabapentin 100 milliGRAM(s) Oral three times a day  heparin  Injectable 5000 Unit(s) SubCutaneous every 12 hours  insulin lispro (HumaLOG) corrective regimen sliding scale   SubCutaneous three times a day before meals  insulin lispro (HumaLOG) corrective regimen sliding scale   SubCutaneous at bedtime  levothyroxine 125 MICROGram(s) Oral daily  miconazole 2% Cream 1 Application(s) Topical two times a day  multivitamin 1 Tablet(s) Oral daily  pantoprazole    Tablet 40 milliGRAM(s) Oral before breakfast  senna 2 Tablet(s) Oral at bedtime  valsartan 160 milliGRAM(s) Oral daily    MEDICATIONS  (PRN):  acetaminophen   Tablet. 650 milliGRAM(s) Oral every 6 hours PRN Mild Pain (1 - 3)  dextrose Gel 1 Dose(s) Oral once PRN Blood Glucose LESS THAN 70 milliGRAM(s)/deciliter  glucagon  Injectable 1 milliGRAM(s) IntraMuscular once PRN Glucose LESS THAN 70 milligrams/deciliter  oxyCODONE    IR 5 milliGRAM(s) Oral every 4 hours PRN Moderate Pain (4 - 6)  polyethylene glycol 3350 17 Gram(s) Oral daily PRN Constipation          PHYSICAL EXAM:  GENERAL: NAD, well-developed  HEAD:  Atraumatic, Normocephalic  EYES: EOMI, PERRLA, conjunctiva and sclera clear  NECK: Supple, No JVD  CHEST/LUNG: Clear to auscultation bilaterally; No wheeze  HEART: Regular rate and rhythm; No murmurs, rubs, or gallops  ABDOMEN: Soft, Nontender, Nondistended; Bowel sounds present  EXTREMITIES:  2+ Peripheral Pulses, No clubbing, cyanosis, or edema  PSYCH: AAOx3  NEUROLOGY: non-focal  SKIN: No rashes or lesions    LABS:                        9.5    6.80  )-----------( 288      ( 08 Feb 2018 09:14 )             29.5     02-08    136  |  97  |  19  ----------------------------<  104<H>  4.7   |  29  |  0.49<L>    Ca    8.6      08 Feb 2018 09:12  Phos  3.6     02-07  Mg     2.2     02-07      PT/INR - ( 07 Feb 2018 04:26 )   PT: 10.4 sec;   INR: 0.96 ratio         PTT - ( 07 Feb 2018 04:26 )  PTT:26.0 sec          RADIOLOGY & ADDITIONAL TESTS:    Imaging Personally Reviewed:    Consultant(s) Notes Reviewed:      Care Discussed with Consultants/Other Providers:

## 2018-02-08 NOTE — PROGRESS NOTE ADULT - ASSESSMENT
1 f with  s/p fall- PT  L hip s/p ORIF- ortho evaluation noted. No intervention .Pain control. Follow with Ortho surgeon from [facility].  Diabetes control  Diabetic diet  HTN control  Depression continue Cymbalta  Anemia- continue iron  DC to rehab   Robert Jacob MD pager [phone]

## 2018-02-08 NOTE — DISCHARGE NOTE ADULT - PLAN OF CARE
Symptoms decreased. On pain medications. Continue pain medications.  Follow up with your Orthopedic Surgeon within 1 week after your discharge from hospital. Your Hemoglobin A1C (HgA1C)was 6.0 this admission on 2/8/'17.  Make sure you get your HgA1c checked every three months.  If you take oral diabetes medications, check your blood glucose two times a day.  If you take insulin, check your blood glucose before meals and at bedtime.  It's important not to skip any meals.  Keep a log of your blood glucose results and always take it with you to your doctor appointments.  Keep a list of your current medications including injectables and over the counter medications and bring this medication list with you to all your doctor appointments.  If you have not seen your ophthalmologist this year call for appointment.  Check your feet daily for redness, sores, or openings. Do not self treat. If no improvement in two days call your primary care physician for an appointment.  Low blood sugar (hypoglycemia) is a blood sugar below 70mg/dl. Check your blood sugar if you feel signs/symptoms of hypoglycemia. If your blood sugar is below 70 take 15 grams of carbohydrates (ex 4 oz of apple juice, 3-4 glucose tablets, or 4-6 oz of regular soda) wait 15 minutes and repeat blood sugar to make sure it comes up above 70.  If your blood sugar is above 70 and you are due for a meal, have a meal.  If you are not due for a meal have a snack.  This snack helps keeps your blood sugar at a safe range. Take your medication as prescribed.  Follow up with your medical doctor for routine blood pressure monitoring, and to establish long term blood pressure treatment goals.  Low salt diet  Activity as tolerated.  Notify your doctor if you have any of the following symptoms:   Dizziness, Lightheadedness, Blurry vision, Headache, Chest pain, Shortness of breath Take your medication as prescribed.   Follow up with your medical doctor for routine blood  work monitoring, and to establish long term treatment goals.

## 2018-02-08 NOTE — DISCHARGE NOTE ADULT - HOSPITAL COURSE
89 yo female with PMH Essential hypertension, Type 2 diabetes mellitus without complication, Hypothyroidism, Osteoarthritis,   and Lt hip fracture,  S/P hip hemiarthroplasty ,  s/p Open Reduction Internal Fixation / Surgical Repair L Hip / IM Nail L Hip on  (1/18) @ [facility] (Surgeon: Unknown)  who presented to [facility] with c/o Lt hip pain s/p fall. CT Head -  No CT evidence of acute intracranial hemorrhage, extra-axial   collection, brain edema or calvarial fracture. Pt had Xrays which revealed Left intertrochanteric fracture with medial displacement of the lesser   trochanter fracture fragment -  indeterminate whether this fracture is acute or whether this is the fracture for which the patient underwent   a left hip ORIF. Evaluated by Orthopedic Surgery with recommendation for medical management and no surgical intervention. Per Ortho pt stable for discharge.  Plan for pain control, WBAT. Pt instructed to Follow up with her  Surgeon @ [facility] after Rehab.

## 2018-02-08 NOTE — DISCHARGE NOTE ADULT - CARE PLAN
Principal Discharge DX:	Hip pain, acute, left  Secondary Diagnosis:	Type 2 diabetes mellitus without complication, without long-term current use of insulin  Secondary Diagnosis:	Essential hypertension  Secondary Diagnosis:	Hypothyroidism, unspecified type Principal Discharge DX:	Hip pain, acute, left  Goal:	Symptoms decreased. On pain medications.  Assessment and plan of treatment:	Continue pain medications.  Follow up with your Orthopedic Surgeon within 1 week after your discharge from hospital.  Secondary Diagnosis:	Type 2 diabetes mellitus without complication, without long-term current use of insulin  Assessment and plan of treatment:	Your Hemoglobin A1C (HgA1C)was 6.0 this admission on 2/8/'17.  Make sure you get your HgA1c checked every three months.  If you take oral diabetes medications, check your blood glucose two times a day.  If you take insulin, check your blood glucose before meals and at bedtime.  It's important not to skip any meals.  Keep a log of your blood glucose results and always take it with you to your doctor appointments.  Keep a list of your current medications including injectables and over the counter medications and bring this medication list with you to all your doctor appointments.  If you have not seen your ophthalmologist this year call for appointment.  Check your feet daily for redness, sores, or openings. Do not self treat. If no improvement in two days call your primary care physician for an appointment.  Low blood sugar (hypoglycemia) is a blood sugar below 70mg/dl. Check your blood sugar if you feel signs/symptoms of hypoglycemia. If your blood sugar is below 70 take 15 grams of carbohydrates (ex 4 oz of apple juice, 3-4 glucose tablets, or 4-6 oz of regular soda) wait 15 minutes and repeat blood sugar to make sure it comes up above 70.  If your blood sugar is above 70 and you are due for a meal, have a meal.  If you are not due for a meal have a snack.  This snack helps keeps your blood sugar at a safe range.  Secondary Diagnosis:	Essential hypertension  Assessment and plan of treatment:	Take your medication as prescribed.  Follow up with your medical doctor for routine blood pressure monitoring, and to establish long term blood pressure treatment goals.  Low salt diet  Activity as tolerated.  Notify your doctor if you have any of the following symptoms:   Dizziness, Lightheadedness, Blurry vision, Headache, Chest pain, Shortness of breath  Secondary Diagnosis:	Hypothyroidism, unspecified type  Assessment and plan of treatment:	Take your medication as prescribed.   Follow up with your medical doctor for routine blood  work monitoring, and to establish long term treatment goals.

## 2018-02-08 NOTE — DISCHARGE NOTE ADULT - CARE PROVIDER_API CALL
Cogan, Fredric  Address: [address] [place] [id number]  Phone: [phone]  Phone: [phone]  Fax: (   )    -

## 2018-02-09 DIAGNOSIS — R69 ILLNESS, UNSPECIFIED: ICD-10-CM

## 2018-02-14 RX ORDER — VALSARTAN 80 MG/1
1 TABLET ORAL
Qty: 0 | Refills: 0 | COMMUNITY

## 2018-02-14 RX ORDER — OMEPRAZOLE 10 MG/1
1 CAPSULE, DELAYED RELEASE ORAL
Qty: 0 | Refills: 0 | COMMUNITY

## 2018-02-14 RX ORDER — DOCUSATE SODIUM 100 MG
3 CAPSULE ORAL
Qty: 0 | Refills: 0 | COMMUNITY

## 2018-02-14 RX ORDER — FERROUS GLUCONATE 100 %
1 POWDER (GRAM) MISCELLANEOUS
Qty: 0 | Refills: 0 | COMMUNITY

## 2018-02-14 RX ORDER — SENNA PLUS 8.6 MG/1
2 TABLET ORAL
Qty: 0 | Refills: 0 | COMMUNITY

## 2018-02-14 RX ORDER — OXYCODONE HYDROCHLORIDE 5 MG/1
0.5 TABLET ORAL
Qty: 0 | Refills: 0 | COMMUNITY

## 2018-02-14 RX ORDER — ACETAMINOPHEN 500 MG
2 TABLET ORAL
Qty: 0 | Refills: 0 | COMMUNITY

## 2018-02-14 RX ORDER — AMLODIPINE BESYLATE 2.5 MG/1
1 TABLET ORAL
Qty: 0 | Refills: 0 | COMMUNITY

## 2018-02-14 RX ORDER — SITAGLIPTIN 50 MG/1
1 TABLET, FILM COATED ORAL
Qty: 0 | Refills: 0 | COMMUNITY

## 2018-02-14 RX ORDER — MICONAZOLE NITRATE 2 %
1 CREAM (GRAM) TOPICAL
Qty: 0 | Refills: 0 | COMMUNITY

## 2018-02-14 RX ORDER — DULOXETINE HYDROCHLORIDE 30 MG/1
1 CAPSULE, DELAYED RELEASE ORAL
Qty: 0 | Refills: 0 | COMMUNITY

## 2018-02-14 RX ORDER — GABAPENTIN 400 MG/1
1 CAPSULE ORAL
Qty: 0 | Refills: 0 | COMMUNITY

## 2018-02-14 RX ORDER — CLOTRIMAZOLE AND BETAMETHASONE DIPROPIONATE 10; .5 MG/G; MG/G
1 CREAM TOPICAL
Qty: 0 | Refills: 0 | COMMUNITY

## 2018-02-14 RX ORDER — METFORMIN HYDROCHLORIDE 850 MG/1
1 TABLET ORAL
Qty: 0 | Refills: 0 | COMMUNITY

## 2018-02-14 RX ORDER — CHOLECALCIFEROL (VITAMIN D3) 125 MCG
1 CAPSULE ORAL
Qty: 0 | Refills: 0 | COMMUNITY

## 2018-02-14 RX ORDER — UBIDECARENONE 100 MG
1 CAPSULE ORAL
Qty: 0 | Refills: 0 | COMMUNITY

## 2018-02-14 RX ORDER — LEVOTHYROXINE SODIUM 125 MCG
1 TABLET ORAL
Qty: 0 | Refills: 0 | COMMUNITY

## 2018-02-14 RX ORDER — POLYETHYLENE GLYCOL 3350 17 G/17G
17 POWDER, FOR SOLUTION ORAL
Qty: 0 | Refills: 0 | COMMUNITY

## 2018-02-22 ENCOUNTER — FORM ENCOUNTER (OUTPATIENT)
Age: 83
End: 2018-02-22

## 2018-02-23 ENCOUNTER — OUTPATIENT (OUTPATIENT)
Dept: OUTPATIENT SERVICES | Facility: HOSPITAL | Age: 83
LOS: 1 days | End: 2018-02-23
Payer: MEDICARE

## 2018-02-23 DIAGNOSIS — Z96.642 PRESENCE OF LEFT ARTIFICIAL HIP JOINT: Chronic | ICD-10-CM

## 2018-02-23 DIAGNOSIS — I82.409 ACUTE EMBOLISM AND THROMBOSIS OF UNSPECIFIED DEEP VEINS OF UNSPECIFIED LOWER EXTREMITY: ICD-10-CM

## 2018-02-23 PROBLEM — E11.9 TYPE 2 DIABETES MELLITUS WITHOUT COMPLICATIONS: Chronic | Status: ACTIVE | Noted: 2018-02-07

## 2018-02-23 PROBLEM — M19.90 UNSPECIFIED OSTEOARTHRITIS, UNSPECIFIED SITE: Chronic | Status: ACTIVE | Noted: 2018-02-07

## 2018-02-23 PROBLEM — E03.9 HYPOTHYROIDISM, UNSPECIFIED: Chronic | Status: ACTIVE | Noted: 2018-02-07

## 2018-02-23 PROBLEM — I10 ESSENTIAL (PRIMARY) HYPERTENSION: Chronic | Status: ACTIVE | Noted: 2018-02-07

## 2018-02-23 PROCEDURE — 93970 EXTREMITY STUDY: CPT | Mod: 26

## 2018-02-23 PROCEDURE — 93970 EXTREMITY STUDY: CPT

## 2019-03-01 ENCOUNTER — OUTPATIENT (OUTPATIENT)
Dept: OUTPATIENT SERVICES | Facility: HOSPITAL | Age: 84
LOS: 1 days | End: 2019-03-01
Payer: MEDICARE

## 2019-03-01 DIAGNOSIS — Z96.642 PRESENCE OF LEFT ARTIFICIAL HIP JOINT: Chronic | ICD-10-CM

## 2019-03-01 PROCEDURE — G9001: CPT

## 2019-03-04 NOTE — PHYSICAL THERAPY INITIAL EVALUATION ADULT - ADDITIONAL COMMENTS
Medication/Dose:   sildenafil (REVATIO) 20 MG tablet     Patient last seen by PCP: 3/1/2018  Next office visit with PCP: None  Last Lab:4/12/2018    Above information requires contacting patient: No    Prescription does not require PDMP check    Sent to provider to approve or deny       Pt lives in private house with spouse before fall and IMN 1/18. Pt was at [facility] prior to this hospital admission.

## 2019-03-05 DIAGNOSIS — Z71.89 OTHER SPECIFIED COUNSELING: ICD-10-CM

## 2021-02-19 NOTE — PHYSICAL THERAPY INITIAL EVALUATION ADULT - REFERRING PHYSICIAN, REHAB EVAL
Patient is having more symptoms than before: weaker, limited mobility, numbness in hands, blurriness and fingers on right side. Patient's spouse noted leg/knee stiffness when he walks. Patient's spouse asked if there are neurologic tests to be done.  Can a referral to a neurologist be done?    Patient's spouse callback: [phone]    Please advise   PT Eval & Treat; Activity: Ambulate with assistance

## 2023-07-19 NOTE — PATIENT PROFILE ADULT. - MEDICATIONS BROUGHT TO HOSPITAL, PROFILE
Pre-op Class Checklist:    Initial Wt: 292 lb  AB Visit:    Wt Readings from Last 1 Encounters:   07/05/23 129.7 kg (286 lb)     Hemoglobin A1C   Date Value Ref Range Status   05/17/2022 5.7 (H) 0.0 - 5.6 % Final     Comment:     Normal <5.7%   Prediabetes 5.7-6.4%    Diabetes 6.5% or higher     Note: Adopted from ADA consensus guidelines.   12/03/2018 4.9 0 - 5.6 % Final     Comment:     Normal <5.7% Prediabetes 5.7-6.4%  Diabetes 6.5% or higher - adopted from ADA   consensus guidelines.        CPAP: Yes   Smoking Cessation Date: Never smoker  Urine Nicotine Screen: N/A   Support Group: Yes   Anticoag Risk: Standard   Actigall: Need   Omeprazole: Need   Consent: Need  Pharmacy: [facility] [address] [place] [address]  Bariatrician: Erin Ash MD   Post-op appointment with Ben: No     Tasklist updated.    Carmencita Suarez RN, CBN  [facility]  P [phone]  F [phone]       no

## 2024-03-01 NOTE — PHYSICAL THERAPY INITIAL EVALUATION ADULT - GROSSLY INTACT, SENSORY
Patient returned to room from HD. Pt A&OX4, VSS with exception to an elevated BP. Pt complaining of left facial, head, and neck pain, not due for any pain medications, will notify hospitalist. Pt instructed to call out for needs.    Grossly Intact/Left LE/Right LE